# Patient Record
(demographics unavailable — no encounter records)

---

## 2025-01-08 NOTE — PLAN
[FreeTextEntry1] : Mr. ERINN TRIMBLE is a 66 year male with a PMH of HTN, HLD, GERD AMMON, Aortic stenosis s/p aortic valve replacement April 2021 comes to the office for preoperative evaluation. Patient denies fever, cough SOB. No other complaints at this time. Patient has greater than 4 METS, is a high perioperative risk for Major adverse cardiac event.  No further testing indicated at this time. Patient is medically optimized for this procedure pending cardiac clearance Dr. Oneill   Patient prescribed pre-op antibiotics due to history of aortic valve replacement.   Prior to appointment and during encounter with patient extensive medical records were reviewed including but not limited to, Hospital records, out patient records, laboratory data and microbiology data    Total encounter total time 40 mins >50% of time spent counseling/coordinating care  Counseling included abnormal lab results, differential diagnoses, treatment options, risks and benefits, lifestyle changes, current condition, medications, and dose adjustments.  The patient was interactive, attentive, asked questions, and verbalized understanding

## 2025-01-08 NOTE — HISTORY OF PRESENT ILLNESS
[Sleep Apnea] : sleep apnea [Aortic Stenosis] : aortic stenosis [No Adverse Anesthesia Reaction] : no adverse anesthesia reaction in self or family member [Chronic Anticoagulation] : no chronic anticoagulation [Chronic Kidney Disease] : no chronic kidney disease [Diabetes] : no diabetes [(Patient denies any chest pain, claudication, dyspnea on exertion, orthopnea, palpitations or syncope)] : Patient denies any chest pain, claudication, dyspnea on exertion, orthopnea, palpitations or syncope [Moderate (4-6 METs)] : Moderate (4-6 METs) [FreeTextEntry1] : Dental Extraction and implantation with sedation [FreeTextEntry2] : 01/09/25 [FreeTextEntry3] : Dr. Mehta at [facility] [FreeTextEntry4] : Mr. ERINN TRIMBLE is a 66 year male with a PMH of HTN, HLD, GERD AMMON, Aortic stenosis s/p aortic valve replacement April 2021 comes to the office for preoperative evaluation. Patient denies fever, cough SOB. No other complaints at this time.

## 2025-01-08 NOTE — ASSESSMENT
[FreeTextEntry4] : Mr. ERINN TRIMBLE is a 66 year male with a PMH of HTN, HLD, GERD AMMON, Aortic stenosis s/p aortic valve replacement April 2021 comes to the office for preoperative evaluation. Patient denies fever, cough SOB. No other complaints at this time. Patient has greater than 4 METS, is a high perioperative risk for Major adverse cardiac event.  No further testing indicated at this time. Patient is medically optimized for this procedure pending cardiac clearance Dr. Oneill

## 2025-07-24 NOTE — HEALTH RISK ASSESSMENT
[Fair] :  ~his/her~ mood as fair [Yes] : Yes [No falls in past year] : Patient reported no falls in the past year [0] : 2) Feeling down, depressed, or hopeless: Not at all (0) [[id number]] : 0 [Former] : Former [20 or more] : 20 or more [< 15 Years] : < 15 Years [de-identified] : quit December 2020 [Patient declined mammogram] : Patient declined mammogram [Patient declined PAP Smear] : Patient declined PAP Smear [Patient declined bone density test] : Patient declined bone density test [Patient reported colonoscopy was normal] : Patient reported colonoscopy was normal [HIV test declined] : HIV test declined [Hepatitis C test declined] : Hepatitis C test declined [ColonoscopyDate] : 01/18

## 2025-07-24 NOTE — HISTORY OF PRESENT ILLNESS
[FreeTextEntry1] : follow up chronic medical conditions.  [de-identified] : Mr. ERINN TRIMBLE is a 67 year male with a PMH of HTN, HLD, GERD AMMON, comes to the office for physical exam. Patient feels well and has no complaints at this time.

## 2025-07-24 NOTE — PLAN
[FreeTextEntry1] : In regards to patients Physical exam, routine blood work drawn, will review results with patient.   HTN- patient's BP well controlled with current medication. will continue current  regimen   Patient will continue to follow with Cardiologist Dr. Oneill   Patient quit smoking 12/2020 - will obtain CT lung cancer screening and call patient with results.  AMMON- patient will continue to follow with Pulmonologist   Insomnia   Patient has been advised of FDA warnings of rare but serious injuries, including deaths, have occurred with certain sedative-hypnotics (eszopiclone [Lunesta], zaleplon [Sonata], and zolpidem [Ambien, Ambien CR, Edluar, Intermezzo, Zolpimist]) because of complex sleep behaviors, including sleepwalking, sleep driving, and engaging in other activities while not fully awake. The FDA is requiring the addition of a Boxed Warning to the prescribing information and Medication Guides for these medications. Additionally, use in patients who have previously experienced an episode of complex sleep behavior with these medicines is now contraindicated.  Patient also advised that these medications are intended for short-term use (=4 to 8 weeks), preferably in conjunction with nonpharmacologic therapies. Chronic use should be limited to cases where nonpharmacologic treatments are not available or not effective and benefits are felt to outweigh risks. I have advised against extended use of pharmacologic therapy for insomnia. Use of the higher dose can increase the risk of next-day impairment of driving and activities requiring full alertness.  Patient was made aware of risks and still wishes to continue medication.  ISTOP checked PRN Ambien refilled PRN  Patient is attempting to decrease Ambien to 5 mg daily and switch to Trazodone.   Regarding patient's obesity - encourage lifestyle modifications - diet and exercise - reduce calorie intake - no soda/ junk food/ snacks - consider mixed grains/ whole grains, leafy vegetables, and an appropriate serving of protein    Counseling included abnormal lab results, differential diagnoses, treatment options, risks and benefits, lifestyle changes, prognosis, current condition, medications, and dose adjustments.  The patient was interactive, attentive, asked questions, and verbalized understanding

## 2025-07-24 NOTE — PHYSICAL EXAM
[No Acute Distress] : no acute distress [Well Nourished] : well nourished [Well Developed] : well developed [Well-Appearing] : well-appearing [Normal Sclera/Conjunctiva] : normal sclera/conjunctiva [PERRL] : pupils equal round and reactive to light [EOMI] : extraocular movements intact [Normal Outer Ear/Nose] : the outer ears and nose were normal in appearance [Normal Oropharynx] : the oropharynx was normal [No JVD] : no jugular venous distention [Supple] : supple [No Lymphadenopathy] : no lymphadenopathy [No Respiratory Distress] : no respiratory distress  [Clear to Auscultation] : lungs were clear to auscultation bilaterally [No Accessory Muscle Use] : no accessory muscle use [Normal Rate] : normal rate  [Regular Rhythm] : with a regular rhythm [Normal S1, S2] : normal S1 and S2 [No Carotid Bruits] : no carotid bruits [No Abdominal Bruit] : a ~M bruit was not heard ~T in the abdomen [No Varicosities] : no varicosities [No Edema] : there was no peripheral edema [No Extremity Clubbing/Cyanosis] : no extremity clubbing/cyanosis [No Palpable Aorta] : no palpable aorta [Soft] : abdomen soft [Non Tender] : non-tender [Non-distended] : non-distended [No HSM] : no HSM [Normal Bowel Sounds] : normal bowel sounds [Normal Posterior Cervical Nodes] : no posterior cervical lymphadenopathy [Normal Anterior Cervical Nodes] : no anterior cervical lymphadenopathy [No CVA Tenderness] : no CVA  tenderness [No Spinal Tenderness] : no spinal tenderness [No Joint Swelling] : no joint swelling [Grossly Normal Strength/Tone] : grossly normal strength/tone [No Rash] : no rash [Normal Gait] : normal gait [Coordination Grossly Intact] : coordination grossly intact [No Focal Deficits] : no focal deficits [Normal Affect] : the affect was normal [Normal Insight/Judgement] : insight and judgment were intact [de-identified] : abscess on right side of back 3 cm x 3 cm